# Patient Record
Sex: FEMALE | Race: BLACK OR AFRICAN AMERICAN | Employment: FULL TIME | URBAN - METROPOLITAN AREA
[De-identification: names, ages, dates, MRNs, and addresses within clinical notes are randomized per-mention and may not be internally consistent; named-entity substitution may affect disease eponyms.]

---

## 2017-01-03 ENCOUNTER — HOSPITAL ENCOUNTER (OUTPATIENT)
Dept: MAMMOGRAPHY | Age: 57
Discharge: HOME | End: 2017-01-03
Attending: INTERNAL MEDICINE
Payer: COMMERCIAL

## 2017-01-03 DIAGNOSIS — R09.81 SINUS CONGESTION: ICD-10-CM

## 2017-01-03 DIAGNOSIS — Z13.9 SCREENING: ICD-10-CM

## 2017-01-03 PROCEDURE — 77067 SCR MAMMO BI INCL CAD: CPT

## 2017-08-08 ENCOUNTER — OFFICE VISIT (OUTPATIENT)
Dept: INTERNAL MEDICINE CLINIC | Age: 57
End: 2017-08-08

## 2017-08-08 VITALS
HEIGHT: 63 IN | TEMPERATURE: 96.5 F | RESPIRATION RATE: 16 BRPM | BODY MASS INDEX: 22.15 KG/M2 | OXYGEN SATURATION: 100 % | DIASTOLIC BLOOD PRESSURE: 74 MMHG | HEART RATE: 75 BPM | SYSTOLIC BLOOD PRESSURE: 129 MMHG | WEIGHT: 125 LBS

## 2017-08-08 DIAGNOSIS — R73.03 PREDIABETES: ICD-10-CM

## 2017-08-08 DIAGNOSIS — M77.8 DELTOID TENDINITIS OF LEFT SHOULDER: Primary | ICD-10-CM

## 2017-08-08 DIAGNOSIS — R09.81 SINUS CONGESTION: ICD-10-CM

## 2017-08-08 RX ORDER — PREDNISONE 20 MG/1
60 TABLET ORAL
Qty: 21 TAB | Refills: 0 | Status: SHIPPED | OUTPATIENT
Start: 2017-08-08 | End: 2017-08-15

## 2017-08-08 NOTE — PROGRESS NOTES
SPORTS MEDICINE AND PRIMARY CARE  Americo Zapata MD, [address], [place]  [address]  Phone:  [phone]  Fax: [phone]       Chief Complaint   Patient presents with    Follow-up     Right Shoulder and arm stiffness and pain   . SUBJECTIVE:    Rukhsana Byrd is a 56 y.o. female Patient returns today alert, appropriate, ambulatory and has the capacity to give an accurate history. She has a known history of prediabetes, sinus congestion, and is seen for evaluation. Patient returns today complaining of pain in her right shoulder for the past three days, onset Saturday. She attributed it to carrying a heavy bag. She doesn't have a history of injury, there is no fall, no trama to the shoulder. Patient is seen for evaluation. Current Outpatient Prescriptions   Medication Sig Dispense Refill    predniSONE (DELTASONE) 20 mg tablet Take 3 Tabs by mouth daily (with breakfast). 21 Tab 0    cetirizine-psuedoePHEDrine (ZYRTEC-D) 5-120 mg per tablet Take 1 Tab by mouth two (2) times a day. 60 Tab 11    CYANOCOBALAMIN, VITAMIN B-12, (VITAMIN B-12 PO) Take 1 Tab by mouth daily.  ASCORBATE CALCIUM (VITAMIN C PO) Take 400 mg by mouth daily.  FERROUS SULFATE PO Take 1 Tab by mouth daily.  LACTOBACILLUS ACIDOPHILUS (PROBIOTIC PO) Take 2 Tabs by mouth daily.  MULTIVITAMIN PO Take 1 Tab by mouth daily.        Past Medical History:   Diagnosis Date    Deltoid tendinitis of left shoulder 2017    Gynecologic exam normal     Prediabetes 2016    Preventative health care 2016    S/P colonoscopy 2012    n    Sinus congestion      Past Surgical History:   Procedure Laterality Date    HX  SECTION      HX GYN       No Known Allergies      REVIEW OF SYSTEMS:  General: negative for - chills or fever  ENT: negative for - headaches, nasal congestion or tinnitus  Respiratory: negative for - cough, hemoptysis, shortness of breath or wheezing  Cardiovascular : negative for - chest pain, edema, palpitations or shortness of breath  Gastrointestinal: negative for - abdominal pain, blood in stools, heartburn or nausea/vomiting  Genito-Urinary: no dysuria, trouble voiding, or hematuria  Musculoskeletal: negative for - gait disturbance, joint pain, joint stiffness or joint swelling  Neurological: no TIA or stroke symptoms  Hematologic: no bruises, no bleeding, no swollen glands  Integument: no lumps, mole changes, nail changes or rash  Endocrine: no malaise/lethargy or unexpected weight changes      Social History     Social History    Marital status:      Spouse name: N/A    Number of children: N/A    Years of education: N/A     Social History Main Topics    Smoking status: Former Smoker    Smokeless tobacco: Never Used    Alcohol use No    Drug use: No    Sexual activity: Yes     Partners: Male     Birth control/ protection: None     Other Topics Concern    None     Social History Narrative    Habits: There is no history of alcohol, cigarette or drug abuse.           Social History: The patient is . Lives with her . She completed her AMADO. She is gainfully employed with [employer]. 22year old son. grandchildren. Patient is a member of [facility].          Family History: Father  at 59 of a myocardial infarction, with memory loss. She has one sister alive and well. Family History   Problem Relation Age of Onset    Heart Disease Father        OBJECTIVE:    Visit Vitals    /74 (BP 1 Location: Left arm, BP Patient Position: Sitting)    Pulse 75    Temp 96.5 °F (35.8 °C) (Oral)    Resp 16    Ht 5' 3\" (1.6 m)    Wt 125 lb (56.7 kg)    SpO2 100%    BMI 22.14 kg/m2     CONSTITUTIONAL: well , well nourished, appears age appropriate  EYES: perrla, eom intact  ENMT:moist mucous membranes, pharynx clear  NECK: supple.  Thyroid normal  RESPIRATORY: Chest: clear bilaterally CARDIOVASCULAR: Heart: regular rate and rhythm  GASTROINTESTINAL: Abdomen: soft, bowel sounds active  HEMATOLOGIC: no pathological lymph nodes palpated  MUSCULOSKELETAL: Extremities: no edema, pulse 1+   INTEGUMENT: No unusual rashes or suspicious skin lesions noted. Nails appear normal.  NEUROLOGIC: non-focal exam   MENTAL STATUS: alert and oriented, appropriate affect           ASSESSMENT:  1. Deltoid tendinitis of left shoulder    2. Prediabetes    3. Sinus congestion      The exam would suggest this is a deltoid tendinitis. that is bothersome is that I can't do  to exclude rotator cuff tendinitis, which I doubt. Will give her a short course of steroids and see if we can sort it out. If the steroids resolve it completely we will see her as previously scheduled. If she continues to have discomfort she will come back to the office in a week and at that time perhaps we could delineate discomfort more if it's related to deltoid tendinitis. Blood pressure remains at goal.    BMI reflects ideal body weight at 22.14, which suggest she not lose any further weight. She's lost about 5 pounds since we last saw her. I don't think this is good for her to continue to do and we suggest that she not lose any further weight. She will return to the office in one week . She's advised to  steroids. PLAN:  .  Orders Placed This Encounter    predniSONE (DELTASONE) 20 mg tablet       Follow-up Disposition:  Return in about 1 week (around 8/15/2017). ATTENTION:   This medical record was transcribed using an electronic medical records system. Although proofread, it may and can contain electronic and spelling errors. Other human spelling and other errors may be present. Corrections may be executed at a later time. Please feel free to contact us for any clarifications as needed.

## 2017-08-08 NOTE — PROGRESS NOTES
1. Have you been to the ER, urgent care clinic since your last visit? Hospitalized since your last visit? No    2. Have you seen or consulted any other health care providers outside of the [address] [place] [address] since your last visit? Include any pap smears or colon screening.  No

## 2017-08-08 NOTE — MR AVS SNAPSHOT
Visit Information Date & Time Provider Department Dept. Phone Encounter #  
 8/8/2017 12:00 PM Tanya Morin  [facility] Sports Medicine and Primary Care [phone] [id number] Follow-up Instructions Return in about 1 week (around 8/15/2017). Your Appointments 9/18/2017  2:45 PM  
ROUTINE CARE with Tanya Morin MD  
[address] [place] [address] and Primary Care [facility]) Appt Note: f/u  
 [address] [facility]  
  
   
 [address] [id number]  
  
    
 10/5/2017  2:30 PM  
YEAR CHECK UP with Alis Daly MD  
[facility] Appt Note: annual exam  
 [place] [address] [address]  
[phone]  
  
   
 [place] [address] [place] Upcoming Health Maintenance Date Due FOBT Q 1 YEAR AGE 50-75 6/4/2012 INFLUENZA AGE 9 TO ADULT 12/6/2017* DTaP/Tdap/Td series (1 - Tdap) 12/7/2017* BREAST CANCER SCRN MAMMOGRAM 1/3/2019 PAP AKA CERVICAL CYTOLOGY 10/4/2019 *Topic was postponed. The date shown is not the original due date. Allergies as of 8/8/2017  Review Complete On: 8/8/2017 By: Tanya Morin MD  
 No Known Allergies Current Immunizations  Never Reviewed No immunizations on file. Not reviewed this visit You Were Diagnosed With   
  
 Codes Comments Deltoid tendinitis of left shoulder    -  Primary ICD-10-CM: M75.82 ICD-9-CM: 726.10 Prediabetes     ICD-10-CM: R73.03 
ICD-9-CM: 790.29 Sinus congestion     ICD-10-CM: R09.81 ICD-9-CM: 478.19 Vitals BP Pulse Temp Resp Height(growth percentile) Weight(growth percentile) 129/74 (BP 1 Location: Left arm, BP Patient Position: Sitting) 75 96.5 °F (35.8 °C) (Oral) 16 5' 3\" (1.6 m) 125 lb (56.7 kg) SpO2 BMI OB Status Smoking Status 100% 22.14 kg/m2 Postmenopausal Former Smoker BMI and BSA Data Body Mass Index Body Surface Area  
 22.14 kg/m 2 1.59 m 2 Preferred Pharmacy Pharmacy Name Phone [facility] [address] [facility] Your Updated Medication List  
  
   
This list is accurate as of: 8/8/17  1:15 PM.  Always use your most recent med list.  
  
  
  
  
 cetirizine-psuedoePHEDrine 5-120 mg per tablet Commonly known as:  ZyrTEC-D Take 1 Tab by mouth two (2) times a day. FERROUS SULFATE PO Take 1 Tab by mouth daily. MULTIVITAMIN PO Take 1 Tab by mouth daily. predniSONE 20 mg tablet Commonly known as:  Mario Doheny Take 3 Tabs by mouth daily (with breakfast). PROBIOTIC PO Take 2 Tabs by mouth daily. VITAMIN B-12 PO Take 1 Tab by mouth daily. VITAMIN C PO Take 400 mg by mouth daily. Prescriptions Sent to Pharmacy Refills  
 predniSONE (DELTASONE) 20 mg tablet 0 Sig: Take 3 Tabs by mouth daily (with breakfast). Class: Normal  
 Pharmacy: [address] [place] [address] Ph #: [phone] Route: Oral  
  
Follow-up Instructions Return in about 1 week (around 8/15/2017). Introducing [facility] & HEALTH SERVICES! Dear Janay Chow: Thank you for requesting a World[employer] account. Our records indicate that you already have an active WorldViz account. You can access your account anytime at [email or web address] Did you know that you can access your hospital and ER discharge instructions at any time in WorldViz? You can also review all of your test results from your hospital stay or ER visit. Additional Information If you have questions, please visit the Frequently Asked Questions section of the World[employer] website at [email or web address] Remember, WorldViz is NOT to be used for urgent needs. For medical emergencies, dial 911. Now available from your iPhone and Android! Please provide this summary of care documentation to your next provider. Your primary care clinician is listed as Ger Lindsey. If you have any questions after today's visit, please call [phone].

## 2017-08-09 RX ORDER — NAPROXEN 500 MG/1
500 TABLET ORAL 2 TIMES DAILY WITH MEALS
Qty: 60 TAB | Refills: 11 | Status: SHIPPED | OUTPATIENT
Start: 2017-08-09 | End: 2017-08-15

## 2017-08-15 ENCOUNTER — OFFICE VISIT (OUTPATIENT)
Dept: INTERNAL MEDICINE CLINIC | Age: 57
End: 2017-08-15

## 2017-08-15 VITALS
DIASTOLIC BLOOD PRESSURE: 78 MMHG | OXYGEN SATURATION: 100 % | RESPIRATION RATE: 16 BRPM | HEIGHT: 63 IN | HEART RATE: 82 BPM | SYSTOLIC BLOOD PRESSURE: 123 MMHG | WEIGHT: 127.1 LBS | BODY MASS INDEX: 22.52 KG/M2 | TEMPERATURE: 99 F

## 2017-08-15 DIAGNOSIS — R73.03 PREDIABETES: ICD-10-CM

## 2017-08-15 DIAGNOSIS — M77.8 DELTOID TENDINITIS OF LEFT SHOULDER: Primary | ICD-10-CM

## 2017-08-15 NOTE — PROGRESS NOTES
SPORTS MEDICINE AND PRIMARY CARE  Evelyne Boswell MD, [place] [id number] [facility] [address]  Phone:  [phone]  Fax: [phone]      Chief Complaint   Patient presents with    Follow-up     1 week for right shoulder pain          SUBECTIVE:    Berlin Pillai is a 56 y.o. female Patient returns today alert, appropriate, ambulatory, has the capacity to give an accurate history. We saw her about a week ago with shoulder discomfort that we felt was more related to deltoid tendinitis and she was given a course of prednisone. She has a known history of prediabetes . Is seen for evaluation. Patient returns today saying she elected not to take the prednisone. She took some Motrin at night and then subsequently went to [employer] and begin praising and raising her arms and the discomfort was all resolved. She comes in today with significant improvement in discomfort and attributes this to the healing power of the [employer]        Current Outpatient Prescriptions   Medication Sig Dispense Refill    CYANOCOBALAMIN, VITAMIN B-12, (VITAMIN B-12 PO) Take 1 Tab by mouth daily.  ASCORBATE CALCIUM (VITAMIN C PO) Take 400 mg by mouth daily.  FERROUS SULFATE PO Take 1 Tab by mouth daily.  LACTOBACILLUS ACIDOPHILUS (PROBIOTIC PO) Take 2 Tabs by mouth daily.  MULTIVITAMIN PO Take 1 Tab by mouth daily. Past Medical History:   Diagnosis Date    Deltoid tendinitis of left shoulder 2017    Gynecologic exam normal     Prediabetes 2016    Preventative health care 2016    S/P colonoscopy 2012    n    Sinus congestion      Past Surgical History:   Procedure Laterality Date    HX  SECTION      HX GYN       No Known Allergies    REVIEW OF SYSTEMS:   . No other joint complaints.         Social History     Social History    Marital status:      Spouse name: N/A    Number of children: N/A    Years of education: N/A     Social History Main Topics    Smoking status: Former Smoker    Smokeless tobacco: Never Used    Alcohol use No    Drug use: No    Sexual activity: Yes     Partners: Male     Birth control/ protection: None     Other Topics Concern    None     Social History Narrative    Habits: There is no history of alcohol, cigarette or drug abuse.           Social History: The patient is . Lives with her . She completed her AMADO. She is gainfully employed with [employer]). 22year old son. grandchildren. Patient is a member of [facility].          Family History: Father  at 59 of a myocardial infarction, with memory loss. She has one sister alive and well.    r  Family History   Problem Relation Age of Onset    Heart Disease Father        OBJECTIVE:  Visit Vitals    /78 (BP 1 Location: Right arm, BP Patient Position: Sitting)    Pulse 82    Temp 99 °F (37.2 °C) (Oral)    Resp 16    Ht 5' 3\" (1.6 m)    Wt 127 lb 1.6 oz (57.7 kg)    SpO2 100%    BMI 22.51 kg/m2     ENT: perrla,  eom intact  NECK: supple. Thyroid normal  CHEST: clear to ascultation and percussion   HEART: regular rate and rhythm  ABD: soft, bowel sounds active  EXTREMITIES: no edema, pulse 1+     No visits with results within 3 Month(s) from this visit. Latest known visit with results is:    Office Visit on 2016   Component Date Value Ref Range Status    Hepatitis A Ab, IgM 2016 Negative  Negative Final    Hep B surface Ag screen 2016 Negative  Negative Final    Hep B Core Ab, IgM 2016 Negative  Negative Final    Hep C Virus Ab 2016 0.2  0.0 - 0.9 s/co ratio Final    Comment:                                   Negative:     < 0.8                               Indeterminate: 0.8 - 0.9                                    Positive:     > 0.9   The CDC recommends that a positive HCV antibody result   be followed up with a HCV Nucleic Acid Amplification   test (983184).       Glucose 2016 79  65 - 99 mg/dL Final    BUN 11/28/2016 11  6 - 24 mg/dL Final    Creatinine 11/28/2016 0.70  0.57 - 1.00 mg/dL Final    GFR est non-AA 11/28/2016 99  >59 mL/min/1.73 Final    GFR est AA 11/28/2016 114  >59 mL/min/1.73 Final    BUN/Creatinine ratio 11/28/2016 16  9 - 23 Final    Sodium 11/28/2016 141  136 - 144 mmol/L Final    Comment: **Effective December 12, 2016 the reference interval**    for Sodium, Serum will be changing to:                                              134 - 144      Potassium 11/28/2016 4.0  3.5 - 5.2 mmol/L Final    Chloride 11/28/2016 99  97 - 106 mmol/L Final    Comment: **Effective December 12, 2016 the reference interval**    for Chloride, Serum will be changing to:                                               96 - 106      CO2 11/28/2016 27  18 - 29 mmol/L Final    Calcium 11/28/2016 10.1  8.7 - 10.2 mg/dL Final          ASSESSMENT:  1. Deltoid tendinitis of left shoulder    2. Prediabetes      The resolution of the deltoid tendinitis was not from medication, but by the spirit of the [address] and praise. She is doing very well. She is  ROM back to normal. There is no tenderness of the deltoid tendon as noted previously. She is doing exceptionally well. Blood pressure control is at goal.  BMI reflects ideal body weight. She will return to see us in a year or sooner if she has any problems. PLAN:  .  Orders Placed This Encounter    URINALYSIS W/ RFLX MICROSCOPIC    CBC WITH AUTOMATED DIFF    METABOLIC PANEL, COMPREHENSIVE    LIPID PANEL    TSH 3RD GENERATION    HEMOGLOBIN A1C WITH EAG       Follow-up Disposition:  Return in about 1 year (around 8/15/2018). ATTENTION:   This medical record was transcribed using an electronic medical records system. Although proofread, it may and can contain electronic and spelling errors. Other human spelling and other errors may be present. Corrections may be executed at a later time.   Please feel free to contact us for any clarifications as needed.

## 2017-08-15 NOTE — PROGRESS NOTES
1. Have you been to the ER, urgent care clinic since your last visit? Hospitalized since your last visit? No    2. Have you seen or consulted any other health care providers outside of the [address] [place] [address] since your last visit? Include any pap smears or colon screening.  No

## 2017-08-15 NOTE — MR AVS SNAPSHOT
Visit Information Date & Time Provider Department Dept. Phone Encounter #  
 8/15/2017 10:30 AM Lucia Fischer MD [facility] [phone] [id number] Follow-up Instructions Return in about 1 year (around 8/15/2018). Follow-up and Disposition History Your Appointments 9/18/2017  2:45 PM  
ROUTINE CARE with Lucia Fischer MD  
[address] [place] [address] and Primary Care [facility]) Appt Note: f/u  
 [address] [facility] [place]  
  
   
 [address] [phone]  
  
    
 9/21/2017 11:30 AM  
ROUTINE CARE with Lucia Fischer MD  
[address] [place] [address] and Primary Care [facility]) Appt Note: 1m f/u  
 [address]  
[phone]  
  
    
 10/5/2017  2:30 PM  
YEAR CHECK UP with Amy Spring MD  
[facility] ([facility]) Appt Note: annual exam  
 [place] [address] [facility] [address] [address]  
[phone]  
  
   
 [place] [address] [place] [address] Upcoming Health Maintenance Date Due FOBT Q 1 YEAR AGE 50-75 6/4/2012 INFLUENZA AGE 9 TO ADULT 12/6/2017* DTaP/Tdap/Td series (1 - Tdap) 12/7/2017* BREAST CANCER SCRN MAMMOGRAM 1/3/2019 PAP AKA CERVICAL CYTOLOGY 10/4/2019 *Topic was postponed. The date shown is not the original due date. Allergies as of 8/15/2017  Review Complete On: 8/15/2017 By: Lucia Fischer MD  
 No Known Allergies Current Immunizations  Never Reviewed No immunizations on file. Not reviewed this visit You Were Diagnosed With   
  
 Codes Comments Deltoid tendinitis of left shoulder    -  Primary ICD-10-CM: M75.82 ICD-9-CM: 726.10 Prediabetes     ICD-10-CM: R73.03 
ICD-9-CM: 790.29 Vitals BP Pulse Temp Resp Height(growth percentile) Weight(growth percentile) 123/78 (BP 1 Location: Right arm, BP Patient Position: Sitting) 82 99 °F (37.2 °C) (Oral) 16 5' 3\" (1.6 m) 127 lb 1.6 oz (57.7 kg) SpO2 BMI OB Status Smoking Status 100% 22.51 kg/m2 Postmenopausal Former Smoker BMI and BSA Data Body Mass Index Body Surface Area  
 22.51 kg/m 2 1.6 m 2 Preferred Pharmacy Pharmacy Name Phone [facility] [address] Your Updated Medication List  
  
   
This list is accurate as of: 8/15/17  4:32 PM.  Always use your most recent med list.  
  
  
  
  
 FERROUS SULFATE PO Take 1 Tab by mouth daily. MULTIVITAMIN PO Take 1 Tab by mouth daily. PROBIOTIC PO Take 2 Tabs by mouth daily. VITAMIN B-12 PO Take 1 Tab by mouth daily. VITAMIN C PO Take 400 mg by mouth daily. We Performed the Following CBC WITH AUTOMATED DIFF [31935 CPT(R)] HEMOGLOBIN A1C WITH EAG [54507 CPT(R)] LIPID PANEL [13307 CPT(R)] METABOLIC PANEL, COMPREHENSIVE [74115 CPT(R)] CT COLLECTION VENOUS BLOOD,VENIPUNCTURE B1780823 CPT(R)] TSH 3RD GENERATION [80582 CPT(R)] URINALYSIS W/ RFLX MICROSCOPIC [89858 CPT(R)] Follow-up Instructions Return in about 1 year (around 8/15/2018). Introducing [facility] & HEALTH SERVICES! Dear Yann Yap: Thank you for requesting a Tem[employer] account. Our records indicate that you already have an active Tempronics account. You can access your account anytime at [email or web address]/Ztail Did you know that you can access your hospital and ER discharge instructions at any time in Tempronics? You can also review all of your test results from your hospital stay or ER visit. Additional Information If you have questions, please visit the Frequently Asked Questions section of the Tempronics website at [email or web address] Remember, Tempronics is NOT to be used for urgent needs.  For medical emergencies, dial 911. Now available from your iPhone and Android! Please provide this summary of care documentation to your next provider. Your primary care clinician is listed as Anita Omer. If you have any questions after today's visit, please call [phone].

## 2017-08-16 LAB
ALBUMIN SERPL-MCNC: 4.3 G/DL (ref 3.5–5.5)
ALBUMIN/GLOB SERPL: 1.4 {RATIO} (ref 1.2–2.2)
ALP SERPL-CCNC: 84 IU/L (ref 39–117)
ALT SERPL-CCNC: 20 IU/L (ref 0–32)
APPEARANCE UR: ABNORMAL
AST SERPL-CCNC: 25 IU/L (ref 0–40)
BACTERIA #/AREA URNS HPF: ABNORMAL /[HPF]
BASOPHILS # BLD AUTO: 0 X10E3/UL (ref 0–0.2)
BASOPHILS NFR BLD AUTO: 1 %
BILIRUB SERPL-MCNC: <0.2 MG/DL (ref 0–1.2)
BILIRUB UR QL STRIP: NEGATIVE
BUN SERPL-MCNC: 16 MG/DL (ref 6–24)
BUN/CREAT SERPL: 25 (ref 9–23)
CALCIUM SERPL-MCNC: 10 MG/DL (ref 8.7–10.2)
CASTS URNS QL MICRO: ABNORMAL /LPF
CHLORIDE SERPL-SCNC: 100 MMOL/L (ref 96–106)
CHOLEST SERPL-MCNC: 184 MG/DL (ref 100–199)
CO2 SERPL-SCNC: 24 MMOL/L (ref 18–29)
COLOR UR: YELLOW
CREAT SERPL-MCNC: 0.64 MG/DL (ref 0.57–1)
CRYSTALS URNS MICRO: ABNORMAL
EOSINOPHIL # BLD AUTO: 0 X10E3/UL (ref 0–0.4)
EOSINOPHIL NFR BLD AUTO: 0 %
EPI CELLS #/AREA URNS HPF: ABNORMAL /HPF
ERYTHROCYTE [DISTWIDTH] IN BLOOD BY AUTOMATED COUNT: 14.1 % (ref 12.3–15.4)
EST. AVERAGE GLUCOSE BLD GHB EST-MCNC: 120 MG/DL
GLOBULIN SER CALC-MCNC: 3.1 G/DL (ref 1.5–4.5)
GLUCOSE SERPL-MCNC: 99 MG/DL (ref 65–99)
GLUCOSE UR QL: NEGATIVE
HBA1C MFR BLD: 5.8 % (ref 4.8–5.6)
HCT VFR BLD AUTO: 42.3 % (ref 34–46.6)
HDLC SERPL-MCNC: 68 MG/DL
HGB BLD-MCNC: 13.6 G/DL (ref 11.1–15.9)
HGB UR QL STRIP: NEGATIVE
IMM GRANULOCYTES # BLD: 0 X10E3/UL (ref 0–0.1)
IMM GRANULOCYTES NFR BLD: 1 %
KETONES UR QL STRIP: NEGATIVE
LDLC SERPL CALC-MCNC: 97 MG/DL (ref 0–99)
LEUKOCYTE ESTERASE UR QL STRIP: ABNORMAL
LYMPHOCYTES # BLD AUTO: 1.5 X10E3/UL (ref 0.7–3.1)
LYMPHOCYTES NFR BLD AUTO: 44 %
MCH RBC QN AUTO: 28.2 PG (ref 26.6–33)
MCHC RBC AUTO-ENTMCNC: 32.2 G/DL (ref 31.5–35.7)
MCV RBC AUTO: 88 FL (ref 79–97)
MICRO URNS: ABNORMAL
MONOCYTES # BLD AUTO: 0.4 X10E3/UL (ref 0.1–0.9)
MONOCYTES NFR BLD AUTO: 10 %
MUCOUS THREADS URNS QL MICRO: PRESENT
NEUTROPHILS # BLD AUTO: 1.6 X10E3/UL (ref 1.4–7)
NEUTROPHILS NFR BLD AUTO: 44 %
NITRITE UR QL STRIP: NEGATIVE
PH UR STRIP: 6 [PH] (ref 5–7.5)
PLATELET # BLD AUTO: 342 X10E3/UL (ref 150–379)
POTASSIUM SERPL-SCNC: 4.6 MMOL/L (ref 3.5–5.2)
PROT SERPL-MCNC: 7.4 G/DL (ref 6–8.5)
PROT UR QL STRIP: NEGATIVE
RBC # BLD AUTO: 4.82 X10E6/UL (ref 3.77–5.28)
RBC #/AREA URNS HPF: ABNORMAL /HPF
SODIUM SERPL-SCNC: 140 MMOL/L (ref 134–144)
SP GR UR: 1.02 (ref 1–1.03)
TRIGL SERPL-MCNC: 95 MG/DL (ref 0–149)
TSH SERPL DL<=0.005 MIU/L-ACNC: 0.54 UIU/ML (ref 0.45–4.5)
UNIDENT CRYS URNS QL MICRO: PRESENT
UROBILINOGEN UR STRIP-MCNC: 0.2 MG/DL (ref 0.2–1)
VLDLC SERPL CALC-MCNC: 19 MG/DL (ref 5–40)
WBC # BLD AUTO: 3.5 X10E3/UL (ref 3.4–10.8)
WBC #/AREA URNS HPF: ABNORMAL /HPF

## 2017-09-18 ENCOUNTER — OFFICE VISIT (OUTPATIENT)
Dept: INTERNAL MEDICINE CLINIC | Age: 57
End: 2017-09-18

## 2017-09-18 VITALS
OXYGEN SATURATION: 96 % | HEART RATE: 80 BPM | HEIGHT: 63 IN | DIASTOLIC BLOOD PRESSURE: 75 MMHG | SYSTOLIC BLOOD PRESSURE: 119 MMHG | WEIGHT: 130.7 LBS | TEMPERATURE: 97.2 F | RESPIRATION RATE: 18 BRPM | BODY MASS INDEX: 23.16 KG/M2

## 2017-09-18 DIAGNOSIS — Z00.00 PREVENTATIVE HEALTH CARE: Primary | ICD-10-CM

## 2017-09-18 DIAGNOSIS — R73.03 PREDIABETES: ICD-10-CM

## 2017-09-18 NOTE — PROGRESS NOTES
1. Have you been to the ER, urgent care clinic since your last visit? Hospitalized since your last visit? No    2. Have you seen or consulted any other health care providers outside of the [address] [place] [address] since your last visit? Include any pap smears or colon screening.  No

## 2017-09-18 NOTE — MR AVS SNAPSHOT
Visit Information Date & Time Provider Department Dept. Phone Encounter #  
 9/18/2017  2:45 PM Conner Chowdhury MD [facility] Sports Medicine and Primary Care [phone] [id number] Follow-up Instructions Return in about 1 year (around 9/18/2018). Follow-up and Disposition History Your Appointments 9/21/2017 11:30 AM  
ROUTINE CARE with Connre Chowdhury MD  
[address] [place] [address] and Primary Care [facility]) Appt Note: 1m f/u  
 [address] [facility]  
  
   
 [address] [phone]  
  
    
 10/5/2017  2:30 PM  
YEAR CHECK UP with Nya Ricardo MD  
[facility]GYN [facility]) Appt Note: annual exam; r/s  
 [place] [address] [address]  
[phone]  
  
   
 [place] [address] [place] [address] Upcoming Health Maintenance Date Due FOBT Q 1 YEAR AGE 50-75 6/4/2012 INFLUENZA AGE 9 TO ADULT 12/6/2017* DTaP/Tdap/Td series (1 - Tdap) 12/7/2017* BREAST CANCER SCRN MAMMOGRAM 1/3/2019 PAP AKA CERVICAL CYTOLOGY 10/4/2019 *Topic was postponed. The date shown is not the original due date. Allergies as of 9/18/2017  Review Complete On: 9/18/2017 By: Conner Chowdhury MD  
 No Known Allergies Current Immunizations  Never Reviewed No immunizations on file. Not reviewed this visit You Were Diagnosed With   
  
 Codes Comments Preventative health care    -  Primary ICD-10-CM: Z00.00 ICD-9-CM: V70.0 Prediabetes     ICD-10-CM: R73.03 
ICD-9-CM: 790.29 Vitals BP Pulse Temp Resp Height(growth percentile) Weight(growth percentile) 119/75 (BP 1 Location: Left arm, BP Patient Position: Sitting) 80 97.2 °F (36.2 °C) (Oral) 18 5' 3\" (1.6 m) 130 lb 11.2 oz (59.3 kg) SpO2 BMI OB Status Smoking Status 96% 23.15 kg/m2 Postmenopausal Former Smoker BMI and BSA Data Body Mass Index Body Surface Area  
 23.15 kg/m 2 1.62 m 2 Preferred Pharmacy Pharmacy Name Phone [facility] [address] [facility] Your Updated Medication List  
  
   
This list is accurate as of: 9/18/17  4:00 PM.  Always use your most recent med list.  
  
  
  
  
 FERROUS SULFATE PO Take 1 Tab by mouth daily. MULTIVITAMIN PO Take 1 Tab by mouth daily. VITAMIN B-12 PO Take 1 Tab by mouth daily. VITAMIN C PO Take 400 mg by mouth daily. Follow-up Instructions Return in about 1 year (around 9/18/2018). Introducing \[facility]\"" & [facility] SERVICES! Dear Vega Bhakta: Thank you for requesting a [employer] account. Our records indicate that you already have an active [employer] account. You can access your account anytime at [email or web address] [email or web address]/[email or web address] [employer]s Did you know that you can access your hospital and ER discharge instructions at any time in Spectrum Bridge? You can also review all of your test results from your hospital stay or ER visit. Additional Information If you have questions, please visit the Frequently Asked Questions section of the [employer] website at [email or web address] [email or web address]/. Remember, [employer] is NOT to be used for urgent needs. For medical emergencies, dial 911. Now available from your iPhone and Android! Please provide this summary of care documentation to your next provider. Your primary care clinician is listed as Юлия Moon. If you have any questions after today's visit, please call [phone].

## 2017-09-18 NOTE — PROGRESS NOTES
SPORTS MEDICINE AND PRIMARY CARE  Cheli Jeong MD, [address] [address]  Phone:  [phone]  Fax: [phone]       Chief Complaint   Patient presents with   [facility] Wellness Visit   . SUBJECTIVE:    Mercy Fernandez is a 56 y.o. female Patient returns today alert, appropriate, ambulatory and has the capacity to give an accurate history. She has a known history of prediabetes, deltoid tendinitis, and is seen for evaluation. Patient is here for a yearly. Her shoulder is no longer bothering her. She is concerned about prediabetes. She is pleased she has finally reached the 130 [address] and is seen for evaluation. Current Outpatient Prescriptions   Medication Sig Dispense Refill    CYANOCOBALAMIN, VITAMIN B-12, (VITAMIN B-12 PO) Take 1 Tab by mouth daily.  ASCORBATE CALCIUM (VITAMIN C PO) Take 400 mg by mouth daily.  FERROUS SULFATE PO Take 1 Tab by mouth daily.  MULTIVITAMIN PO Take 1 Tab by mouth daily.        Past Medical History:   Diagnosis Date    Deltoid tendinitis of left shoulder 2017    Gynecologic exam normal     Prediabetes 2016    Preventative health care 2016    S/P colonoscopy 2012    n    Sinus congestion      Past Surgical History:   Procedure Laterality Date    HX  SECTION      HX GYN       No Known Allergies      REVIEW OF SYSTEMS:  General: negative for - chills or fever  ENT: negative for - headaches, nasal congestion or tinnitus  Respiratory: negative for - cough, hemoptysis, shortness of breath or wheezing  Cardiovascular : negative for - chest pain, edema, palpitations or shortness of breath  Gastrointestinal: negative for - abdominal pain, blood in stools, heartburn or nausea/vomiting  Genito-Urinary: no dysuria, trouble voiding, or hematuria  Musculoskeletal: negative for - gait disturbance, joint pain, joint stiffness or joint swelling  Neurological: no TIA or stroke symptoms  Hematologic: no bruises, no bleeding, no swollen glands  Integument: no lumps, mole changes, nail changes or rash  Endocrine: no malaise/lethargy or unexpected weight changes      Social History     Social History    Marital status:      Spouse name: N/A    Number of children: N/A    Years of education: N/A     Social History Main Topics    Smoking status: Former Smoker    Smokeless tobacco: Never Used    Alcohol use No    Drug use: No    Sexual activity: Yes     Partners: Male     Birth control/ protection: None     Other Topics Concern    None     Social History Narrative    Habits: There is no history of alcohol, cigarette or drug abuse.           Social History: The patient is . Lives with her . She completed her AMADO. She is gainfully employed with [employer] and [employer]. 22year old son. grandchildren. Patient is a member of [facility].          Family History: Father  at 59 of a myocardial infarction, with memory loss. She has one sister alive and well. Family History   Problem Relation Age of Onset    Heart Disease Father        OBJECTIVE:    Visit Vitals    /75 (BP 1 Location: Left arm, BP Patient Position: Sitting)    Pulse 80    Temp 97.2 °F (36.2 °C) (Oral)    Resp 18    Ht 5' 3\" (1.6 m)    Wt 130 lb 11.2 oz (59.3 kg)    SpO2 96%    BMI 23.15 kg/m2     CONSTITUTIONAL: well , well nourished, appears age appropriate  EYES: perrla, eom intact  ENMT:moist mucous membranes, pharynx clear  NECK: supple. Thyroid normal  RESPIRATORY: Chest: clear bilaterally   CARDIOVASCULAR: Heart: regular rate and rhythm  GASTROINTESTINAL: Abdomen: soft, bowel sounds active  HEMATOLOGIC: no pathological lymph nodes palpated  MUSCULOSKELETAL: Extremities: no edema, pulse 1+   INTEGUMENT: No unusual rashes or suspicious skin lesions noted. Nails appear normal.  NEUROLOGIC: non-focal exam   MENTAL STATUS: alert and oriented, appropriate affect           ASSESSMENT:  1. Preventative health care    2. Prediabetes      Patient's medical condition is stable. Her BMI reflects ideal body weight. The actual weight, however, is  and we certainly are pleased that she gained the weight. Blood pressure control is at goal.    Lab studies are reviewed and are completely unremarkable except the Hgb A1c of 5.8, which is an improvement from the last visit a year ago. Her cholesterol is at goal with LDL 97, HDL 68 and cholesterol of 184. most days of the week and we encouraged physical activity and a heart healthy lifestyle. She'll return to the office in one year, sooner if she has any problems. We gave her the name of   for OB/GYN. PLAN:  . No orders of the defined types were placed in this encounter. Follow-up Disposition:  Return in about 1 year (around 9/18/2018). ATTENTION:   This medical record was transcribed using an electronic medical records system. Although proofread, it may and can contain electronic and spelling errors. Other human spelling and other errors may be present. Corrections may be executed at a later time. Please feel free to contact us for any clarifications as needed.

## 2017-10-05 ENCOUNTER — OFFICE VISIT (OUTPATIENT)
Dept: OBGYN CLINIC | Age: 57
End: 2017-10-05

## 2017-10-05 VITALS
HEIGHT: 63 IN | SYSTOLIC BLOOD PRESSURE: 119 MMHG | HEART RATE: 83 BPM | DIASTOLIC BLOOD PRESSURE: 78 MMHG | BODY MASS INDEX: 23.32 KG/M2 | WEIGHT: 131.6 LBS

## 2017-10-05 DIAGNOSIS — Z01.419 WELL FEMALE EXAM WITH ROUTINE GYNECOLOGICAL EXAM: Primary | ICD-10-CM

## 2017-10-05 DIAGNOSIS — Z12.31 VISIT FOR SCREENING MAMMOGRAM: ICD-10-CM

## 2017-10-05 DIAGNOSIS — Z01.419 WELL WOMAN EXAM WITH ROUTINE GYNECOLOGICAL EXAM: ICD-10-CM

## 2017-10-05 NOTE — PROGRESS NOTES
Terrie Lugo is a No obstetric history on file. ,  56 y.o. female [address] whose LMP was on  who presents for her annual checkup. She is having no significant problems. Menstrual status:    She is not having periods because she is menopausal.    The patient is not using HRT. Contraception:    She does not need contraception because she is menopausal.    Sexual history:    She  reports that she currently engages in sexual activity and has had male partners. She reports using the following method of birth control/protection: None. Medical conditions:    Since her last annual GYN exam about one year ago, she has had the following changes in her health history: none. Pap and Mammogram History:    Her most recent Pap smear was negative and HPV negative obtained 1 year(s) ago. The patient had a recent mammogram 2017 which was negative for malignancy. Breast Cancer History/Substance Abuse:    She has no family history of breast cancer. Osteoporosis History:    Family history does not include a first or second degree relative with osteopenia or osteoporosis. A bone density scan has not been previously obtained. Past Medical History:   Diagnosis Date    Deltoid tendinitis of left shoulder 2017    Gynecologic exam normal     Prediabetes 2016    Preventative health care 2016    S/P colonoscopy 2012    n    Sinus congestion      Past Surgical History:   Procedure Laterality Date    HX  SECTION      HX GYN       Current Outpatient Prescriptions   Medication Sig Dispense Refill    CYANOCOBALAMIN, VITAMIN B-12, (VITAMIN B-12 PO) Take 1 Tab by mouth daily.  ASCORBATE CALCIUM (VITAMIN C PO) Take 400 mg by mouth daily.  FERROUS SULFATE PO Take 1 Tab by mouth daily.  MULTIVITAMIN PO Take 1 Tab by mouth daily. Allergies: Review of patient's allergies indicates no known allergies.    Social History     Social History    Marital status:      Spouse name: N/A    Number of children: N/A    Years of education: N/A     Occupational History    Not on file. Social History Main Topics    Smoking status: Former Smoker    Smokeless tobacco: Never Used    Alcohol use No    Drug use: No    Sexual activity: Yes     Partners: Male     Birth control/ protection: None     Other Topics Concern    Not on file     Social History Narrative    Habits: There is no history of alcohol, cigarette or drug abuse.           Social History: The patient is . Lives with her . She completed her AMADO. She is gainfully employed with [employer] and [employer]. 22year old son. grandchildren. Patient is a member of [employer].          Family History: Father  at 59 of a myocardial infarction, with memory loss. She has one sister alive and well. Tobacco History:  reports that she has quit smoking. She has never used smokeless tobacco.  Alcohol Abuse:  reports that she does not drink alcohol. Drug Abuse:  reports that she does not use illicit drugs.   Patient Active Problem List   Diagnosis Code    Preventative health care Z00.00    S/P colonoscopy Z98.890    Gynecologic exam normal Z01.419    Prediabetes R73.03    Sinus congestion R09.81    Deltoid tendinitis of left shoulder M75.82         Review of Systems - History obtained from the patient  Constitutional: negative for weight loss, fever, night sweats  HEENT: negative for hearing loss, earache, congestion, snoring, sorethroat  CV: negative for chest pain, palpitations, edema  Resp: negative for cough, shortness of breath, wheezing  GI: negative for change in bowel habits, abdominal pain, black or bloody stools  : negative for frequency, dysuria, hematuria, vaginal discharge  MSK: negative for back pain, joint pain, muscle pain  Breast: negative for breast lumps, nipple discharge, galactorrhea  Skin :negative for itching, rash, hives  Neuro: negative for dizziness, headache, confusion, weakness  Psych: negative for anxiety, depression, change in mood  Heme/lymph: negative for bleeding, bruising, pallor    Physical Exam    Visit Vitals    /78 (BP 1 Location: Right arm, BP Patient Position: Sitting)    Pulse 83    Ht 5' 3\" (1.6 m)    Wt 131 lb 9.6 oz (59.7 kg)    BMI 23.31 kg/m2     Constitutional  · Appearance: well-nourished, well developed, alert, in no acute distress    HENT  · Head and Face: appears normal    Neck  · Inspection/Palpation: normal appearance, no masses or tenderness  · Lymph Nodes: no lymphadenopathy present    Chest  · Respiratory Effort: breathing normal    Breasts  · Inspection of Breasts: breasts symmetrical, no skin changes, no discharge present, nipple appearance normal, no skin retraction present  · Palpation of Breasts and Axillae: no masses present on palpation, no breast tenderness  · Axillary Lymph Nodes: no lymphadenopathy present    Gastrointestinal  · Abdominal Examination: abdomen non-tender to palpation, normal bowel sounds, no masses present  · Liver and spleen: no hepatomegaly present, spleen not palpable  · Hernias: no hernias identified    Skin  · General Inspection: no rash, no lesions identified    Neurologic/Psychiatric  · Mental Status:  · Orientation: grossly oriented to person, place and time  · Mood and Affect: mood normal, affect appropriate    Genitourinary  · External Genitalia: normal appearance for age, no discharge present, no tenderness present, no inflammatory lesions present, no masses present, no atrophy present  · Vagina: normal vaginal vault without central or paravaginal defects, no discharge present, no inflammatory lesions present, no masses present, atrophic changes present  · Bladder: non-tender to palpation  · Urethra: appears normal  · Cervix: normal   · Uterus: normal size, shape and consistency  · Adnexa: no adnexal tenderness present, no adnexal masses present  · Perineum: perineum within normal limits, no evidence of trauma, no rashes or skin lesions present  · Anus: anus within normal limits, no hemorrhoids present  · Inguinal Lymph Nodes: no lymphadenopathy present    Assessment:  Routine gynecologic examination  Her current medical status is satisfactory with no evidence of significant gynecologic issues.     Plan:  Counseled re: diet, exercise, healthy lifestyle  Return for yearly wellness visits  Rec annual mammogram  Patient Verbalized understanding

## 2017-10-05 NOTE — MR AVS SNAPSHOT
Visit Information Date & Time Provider Department Dept. Phone Encounter #  
 10/5/2017  2:30 PM Jasbir Alexander MD [facility] [phone] [id number] Your Appointments 9/20/2018  9:00 AM  
Any with Emilia Roy MD  
[address] [place] [address] and Primary Care [facility]) Appt Note: 1 year f/u  
 [address] [facility]  
  
   
 [address] [id number] Upcoming Health Maintenance Date Due FOBT Q 1 YEAR AGE 50-75 6/4/2012 INFLUENZA AGE 9 TO ADULT 12/6/2017* BREAST CANCER SCRN MAMMOGRAM 1/3/2019 PAP AKA CERVICAL CYTOLOGY 10/4/2019 *Topic was postponed. The date shown is not the original due date. Allergies as of 10/5/2017  Review Complete On: 10/5/2017 By: Sonam Ricardo LPN No Known Allergies Current Immunizations  Never Reviewed No immunizations on file. Not reviewed this visit You Were Diagnosed With   
  
 Codes Comments Well female exam with routine gynecological exam    -  Primary ICD-10-CM: V84.513 ICD-9-CM: V72.31 Visit for screening mammogram     ICD-10-CM: Z12.31 
ICD-9-CM: V76.12 Vitals Height(growth percentile) Weight(growth percentile) BMI OB Status Smoking Status 5' 3\" (1.6 m) 131 lb 9.6 oz (59.7 kg) 23.31 kg/m2 Postmenopausal Former Smoker BMI and BSA Data Body Mass Index Body Surface Area  
 23.31 kg/m 2 1.63 m 2 Preferred Pharmacy Pharmacy Name Phone [facility] [address] Your Updated Medication List  
  
   
This list is accurate as of: 10/5/17  2:51 PM.  Always use your most recent med list.  
  
  
  
  
 FERROUS SULFATE PO Take 1 Tab by mouth daily. MULTIVITAMIN PO Take 1 Tab by mouth daily. VITAMIN B-12 PO Take 1 Tab by mouth daily. VITAMIN C PO Take 400 mg by mouth daily. Patient Instructions Well Visit, Women 48 to 72: Care Instructions Your Care Instructions Physical exams can help you stay healthy. Your doctor has checked your overall health and may have suggested ways to take good care of yourself. He or she also may have recommended tests. At home, you can help prevent illness with healthy eating, regular exercise, and other steps. Follow-up care is a key part of your treatment and safety. Be sure to make and go to all appointments, and call your doctor if you are having problems. It's also a good idea to know your test results and keep a list of the medicines you take. How can you care for yourself at home? · Reach and stay at a healthy weight. This will lower your risk for many problems, such as obesity, diabetes, heart disease, and high blood pressure. · Get at least 30 minutes of exercise on most days of the week. Walking is a good choice. You also may want to do other activities, such as running, swimming, cycling, or playing tennis or team sports. · Do not smoke. Smoking can make health problems worse. If you need help quitting, talk to your doctor about stop-smoking programs and medicines. These can increase your chances of quitting for good. · Protect your skin from too much sun. When you're outdoors from 10 a.m. to 4 p.m., stay in the shade or cover up with clothing and a hat with a wide brim. Wear sunglasses that block UV rays. Even when it's cloudy, put broad-spectrum sunscreen (SPF 30 or higher) on any exposed skin. · See a dentist one or two times a year for checkups and to have your teeth cleaned. · Wear a seat belt in the car. · Limit alcohol to 1 drink a day. Too much alcohol can cause health problems. Follow your doctor's advice about when to have certain tests. These tests can spot problems early. · Cholesterol.  Your doctor will tell you how often to have this done based on your age, family history, or other things that can increase your risk for heart attack and stroke. · Blood pressure. Have your blood pressure checked during a routine doctor visit. Your doctor will tell you how often to check your blood pressure based on your age, your blood pressure results, and other factors. · Mammogram. Ask your doctor how often you should have a mammogram, which is an X-ray of your breasts. A mammogram can spot breast cancer before it can be felt and when it is easiest to treat. · Pap test and pelvic exam. Ask your doctor how often you should have a Pap test. You may not need to have a Pap test as often as you used to. · Vision. Have your eyes checked every year or two or as often as your doctor suggests. Some experts recommend that you have yearly exams for glaucoma and other age-related eye problems starting at age 48. · Hearing. Tell your doctor if you notice any change in your hearing. You can have tests to find out how well you hear. · Diabetes. Ask your doctor whether you should have tests for diabetes. · Colon cancer. You should begin tests for colon cancer at age 48. You may have one of several tests. Your doctor will tell you how often to have tests based on your age and risk. Risks include whether you already had a precancerous polyp removed from your colon or whether your parents, sisters and brothers, or children have had colon cancer. · Thyroid disease. Talk to your doctor about whether to have your thyroid checked as part of a regular physical exam. Women have an increased chance of a thyroid problem. · Osteoporosis. You should begin tests for bone density at age 72. If you are younger than 72, ask your doctor whether you have factors that may increase your risk for this disease. You may want to have this test before age 72. · Heart attack and stroke risk. At least every 4 to 6 years, you should have your risk for heart attack and stroke assessed.  Your doctor uses factors such as your age, blood pressure, cholesterol, and whether you smoke or have diabetes to show what your risk for a heart attack or stroke is over the next 10 years. When should you call for help? Watch closely for changes in your health, and be sure to contact your doctor if you have any problems or symptoms that concern you. Where can you learn more? Go to http://julio-tati.info/. Enter F791 in the search box to learn more about \"Well Visit, Women 50 to 72: Care Instructions. \" Current as of: July 19, 2016 Content Version: 11.3 © 0734-3535 [employer]. Care instructions adapted under license by [employer] (which disclaims liability or warranty for this information). If you have questions about a medical condition or this instruction, always ask your healthcare professional. [address] any warranty or liability for your use of this information. Introducing [facility]! Dear Santosh Gracia: Thank you for requesting a [employer] account. Our records indicate that you already have an active [employer] account. You can access your account anytime at [email or web address] Did you know that you can access your hospital and ER discharge instructions at any time in [employer]? You can also review all of your test results from your hospital stay or ER visit. Additional Information If you have questions, please visit the Frequently Asked Questions section of the [employer] website at [email or web address] Remember, [employer] is NOT to be used for urgent needs. For medical emergencies, dial 911. Now available from your iPhone and Android! Please provide this summary of care documentation to your next provider. Your primary care clinician is listed as Sabine Mcdermott. If you have any questions after today's visit, please call [phone].

## 2017-10-05 NOTE — PATIENT INSTRUCTIONS
Well Visit, Women 48 to 72: Care Instructions  Your Care Instructions  Physical exams can help you stay healthy. Your doctor has checked your overall health and may have suggested ways to take good care of yourself. He or she also may have recommended tests. At home, you can help prevent illness with healthy eating, regular exercise, and other steps. Follow-up care is a key part of your treatment and safety. Be sure to make and go to all appointments, and call your doctor if you are having problems. It's also a good idea to know your test results and keep a list of the medicines you take. How can you care for yourself at home? · Reach and stay at a healthy weight. This will lower your risk for many problems, such as obesity, diabetes, heart disease, and high blood pressure. · Get at least 30 minutes of exercise on most days of the week. Walking is a good choice. You also may want to do other activities, such as running, swimming, cycling, or playing tennis or team sports. · Do not smoke. Smoking can make health problems worse. If you need help quitting, talk to your doctor about stop-smoking programs and medicines. These can increase your chances of quitting for good. · Protect your skin from too much sun. When you're outdoors from 10 a.m. to 4 p.m., stay in the shade or cover up with clothing and a hat with a wide brim. Wear sunglasses that block UV rays. Even when it's cloudy, put broad-spectrum sunscreen (SPF 30 or higher) on any exposed skin. · See a dentist one or two times a year for checkups and to have your teeth cleaned. · Wear a seat belt in the car. · Limit alcohol to 1 drink a day. Too much alcohol can cause health problems. Follow your doctor's advice about when to have certain tests. These tests can spot problems early. · Cholesterol.  Your doctor will tell you how often to have this done based on your age, family history, or other things that can increase your risk for heart attack and stroke. · Blood pressure. Have your blood pressure checked during a routine doctor visit. Your doctor will tell you how often to check your blood pressure based on your age, your blood pressure results, and other factors. · Mammogram. Ask your doctor how often you should have a mammogram, which is an X-ray of your breasts. A mammogram can spot breast cancer before it can be felt and when it is easiest to treat. · Pap test and pelvic exam. Ask your doctor how often you should have a Pap test. You may not need to have a Pap test as often as you used to. · Vision. Have your eyes checked every year or two or as often as your doctor suggests. Some experts recommend that you have yearly exams for glaucoma and other age-related eye problems starting at age 48. · Hearing. Tell your doctor if you notice any change in your hearing. You can have tests to find out how well you hear. · Diabetes. Ask your doctor whether you should have tests for diabetes. · Colon cancer. You should begin tests for colon cancer at age 48. You may have one of several tests. Your doctor will tell you how often to have tests based on your age and risk. Risks include whether you already had a precancerous polyp removed from your colon or whether your parents, sisters and brothers, or children have had colon cancer. · Thyroid disease. Talk to your doctor about whether to have your thyroid checked as part of a regular physical exam. Women have an increased chance of a thyroid problem. · Osteoporosis. You should begin tests for bone density at age 72. If you are younger than 72, ask your doctor whether you have factors that may increase your risk for this disease. You may want to have this test before age 72. · Heart attack and stroke risk. At least every 4 to 6 years, you should have your risk for heart attack and stroke assessed.  Your doctor uses factors such as your age, blood pressure, cholesterol, and whether you smoke or have diabetes to show what your risk for a heart attack or stroke is over the next 10 years. When should you call for help? Watch closely for changes in your health, and be sure to contact your doctor if you have any problems or symptoms that concern you. Where can you learn more? Go to http://julio-tati.info/. Enter S819 in the search box to learn more about \"Well Visit, Women 50 to 72: Care Instructions. \"  Current as of: July 19, 2016  Content Version: 11.3  © 5777-2734 [employer]. Care instructions adapted under license by [employer] (which disclaims liability or warranty for this information). If you have questions about a medical condition or this instruction, always ask your healthcare professional. [address] any warranty or liability for your use of this information.

## 2018-08-15 ENCOUNTER — HOSPITAL ENCOUNTER (OUTPATIENT)
Dept: MAMMOGRAPHY | Age: 58
Discharge: HOME | End: 2018-08-15
Attending: OBSTETRICS & GYNECOLOGY
Payer: COMMERCIAL

## 2018-08-15 DIAGNOSIS — Z12.31 VISIT FOR SCREENING MAMMOGRAM: ICD-10-CM

## 2018-08-15 PROCEDURE — 77063 BREAST TOMOSYNTHESIS BI: CPT

## 2018-09-20 ENCOUNTER — OFFICE VISIT (OUTPATIENT)
Dept: INTERNAL MEDICINE CLINIC | Age: 58
End: 2018-09-20

## 2018-09-20 VITALS
TEMPERATURE: 98.1 F | BODY MASS INDEX: 23.05 KG/M2 | OXYGEN SATURATION: 98 % | HEIGHT: 63 IN | HEART RATE: 77 BPM | WEIGHT: 130.1 LBS | RESPIRATION RATE: 18 BRPM | DIASTOLIC BLOOD PRESSURE: 74 MMHG | SYSTOLIC BLOOD PRESSURE: 119 MMHG

## 2018-09-20 DIAGNOSIS — Z00.00 PREVENTATIVE HEALTH CARE: Primary | ICD-10-CM

## 2018-09-20 NOTE — PROGRESS NOTES
1. Have you been to the ER, urgent care clinic since your last visit? Hospitalized since your last visit? No 
 
2. Have you seen or consulted any other health care providers outside of the [facility] since your last visit? Include any pap smears or colon screening.  No

## 2018-09-20 NOTE — MR AVS SNAPSHOT
Adry Espinal 
 
 
 . [facility] [address] [address] 
[phone] Patient: Terrie Lugo MRN: [id number]:[id number] Visit Information Date & Time Provider Department Dept. Phone Encounter #  
 9/20/2018  9:00 AM Hernesto Bermeo [facility] [phone] [id number] Follow-up Instructions Return in about 1 year (around 9/20/2019). Follow-up and Disposition History Upcoming Health Maintenance Date Due DTaP/Tdap/Td series (1 - Tdap) 9/20/2019* Influenza Age 5 to Adult 9/20/2019* PAP AKA CERVICAL CYTOLOGY 10/4/2019 BREAST CANCER SCRN MAMMOGRAM 8/15/2020 COLONOSCOPY 4/10/2022 *Topic was postponed. The date shown is not the original due date. Allergies as of 9/20/2018  Review Complete On: 9/20/2018 By: Julio Cesar Earl LPN No Known Allergies Current Immunizations  Never Reviewed No immunizations on file. Not reviewed this visit You Were Diagnosed With   
  
 Codes Comments Preventative health care    -  Primary ICD-10-CM: Z00.00 ICD-9-CM: V70.0 Vitals BP Pulse Temp Resp Height(growth percentile) Weight(growth percentile) 119/74 77 98.1 °F (36.7 °C) (Oral) 18 5' 3\" (1.6 m) 130 lb 1.6 oz (59 kg) SpO2 BMI OB Status Smoking Status 98% 23.05 kg/m2 Postmenopausal Former Smoker Vitals History BMI and BSA Data Body Mass Index Body Surface Area 23.05 kg/m 2 1.62 m 2 Preferred Pharmacy Pharmacy Name Phone [address], [address] [place] [address] [phone] Your Updated Medication List  
  
   
This list is accurate as of 9/20/18 10:15 AM.  Always use your most recent med list.  
  
  
  
  
 MULTIVITAMIN PO Take 1 Tab by mouth daily. VITAMIN B-12 PO Take 1 Tab by mouth daily. VITAMIN C PO Take 400 mg by mouth daily. We Performed the Following CBC WITH AUTOMATED DIFF [15980 CPT(R)] COLLECTION VENOUS BLOOD,VENIPUNCTURE [id number] CPT(R)] HEMOGLOBIN A1C WITH EAG [30703 CPT(R)] LIPID PANEL [51350 CPT(R)] METABOLIC PANEL, COMPREHENSIVE [53567 CPT(R)] TSH 3RD GENERATION [12443 CPT(R)] URINALYSIS W/ RFLX MICROSCOPIC [71528 CPT(R)] Follow-up Instructions Return in about 1 year (around 9/20/2019). Introducing hospitals & HEALTH SERVICES! Dear Jacklyn Mahmood: Thank you for requesting a [employer] account. Our records indicate that you already have an active [employer] account. You can access your account anytime at [email or web address] Did you know that you can access your hospital and ER discharge instructions at any time in [employer]ed? You can also review all of your test results from your hospital stay or ER visit. Additional Information If you have questions, please visit the Frequently Asked Questions section of the [employer]ed website at [email or web address] Remember, [employer]ed is NOT to be used for urgent needs. For medical emergencies, dial 911. Now available from your iPhone and Android! Please provide this summary of care documentation to your next provider. Your primary care clinician is listed as Johana Greer. If you have any questions after today's visit, please call [phone].

## 2018-09-20 NOTE — PROGRESS NOTES
SPORTS MEDICINE AND PRIMARY CARE Madyson Partida MD, [address] [address] Phone:  [phone]  Fax: [phone] Chief Complaint Patient presents with  Annual Exam  
 
 
 
SUBECTIVE: 
 
Randee Chapa is a 66 y.o. female Patient returns today for her annual physical examination. She is doing well. She is up to date on her required studies. Since we last saw her she did 80,000 steps, which is close to 40 miles. It took about seven hours and that is just amazing. She is otherwise doing well and is following appropriate recommendations. Patient is seen for evaluation. Current Outpatient Prescriptions Medication Sig Dispense Refill  CYANOCOBALAMIN, VITAMIN B-12, (VITAMIN B-12 PO) Take 1 Tab by mouth daily.  ASCORBATE CALCIUM (VITAMIN C PO) Take 400 mg by mouth daily.  MULTIVITAMIN PO Take 1 Tab by mouth daily. Past Medical History:  
Diagnosis Date  Deltoid tendinitis of left shoulder 2017  Gynecologic exam normal   
 Prediabetes 2016  Preventative health care 2016  S/P colonoscopy 2012  
 n  Sinus congestion Past Surgical History:  
Procedure Laterality Date  HX  SECTION    
 HX GYN No Known Allergies REVIEW OF SYSTEMS: 
 No chest pain or shortness of breath. Social History Social History  Marital status:  Spouse name: N/A  
 Number of children: N/A  
 Years of education: N/A Social History Main Topics  Smoking status: Former Smoker  Smokeless tobacco: Never Used  Alcohol use No  
 Drug use: No  
 Sexual activity: Yes  
  Partners: Male Birth control/ protection: None Other Topics Concern  None Social History Narrative Habits: There is no history of alcohol, cigarette or drug abuse.    
    
 Social History: The patient is . Lives with her .  She completed her AMADO. She is gainfully employed with [employer]. 22year old son. grandchildren. Patient is a member of [facility].   
    
 Family History: Father  at 59 of a myocardial infarction, with memory loss. She has one sister alive and well.   
r 
Family History Problem Relation Age of Onset  Heart Disease Father OBJECTIVE: 
Visit Vitals  /74  Pulse 77  Temp 98.1 °F (36.7 °C) (Oral)  Resp 18  Ht 5' 3\" (1.6 m)  Wt 130 lb 1.6 oz (59 kg)  SpO2 98%  BMI 23.05 kg/m2 ENT: perrla,  eom intact NECK: supple. Thyroid normal 
CHEST: clear to ascultation and percussion HEART: regular rate and rhythm ABD: soft, bowel sounds active EXTREMITIES: no edema, pulse 1+ No visits with results within 3 Month(s) from this visit. Latest known visit with results is: 
 
Office Visit on 08/15/2017 Component Date Value Ref Range Status  Specific Gravity 08/15/2017 1.024  1.005 - 1.030 Final  
 pH (UA) 08/15/2017 6.0  5.0 - 7.5 Final  
 Color 08/15/2017 Yellow  Yellow Final  
 Appearance 08/15/2017 Cloudy* Clear Final  
 Leukocyte Esterase 08/15/2017 1+* Negative Final  
 Protein 08/15/2017 Negative  Negative/Trace Final  
 Glucose 08/15/2017 Negative  Negative Final  
 Ketone 08/15/2017 Negative  Negative Final  
 Blood 08/15/2017 Negative  Negative Final  
 Bilirubin 08/15/2017 Negative  Negative Final  
 Urobilinogen 08/15/2017 0.2  0.2 - 1.0 mg/dL Final  
 Nitrites 08/15/2017 Negative  Negative Final  
 Microscopic Examination 08/15/2017 See additional order   Final  
 Microscopic was indicated and was performed.   
 WBC 08/15/2017 3.5  3.4 - 10.8 x10E3/uL Final  
 RBC 08/15/2017 4.82  3.77 - 5.28 x10E6/uL Final  
 HGB 08/15/2017 13.6  11.1 - 15.9 g/dL Final  
 HCT 08/15/2017 42.3  34.0 - 46.6 % Final  
 MCV 08/15/2017 88  79 - 97 fL Final  
 MCH 08/15/2017 28.2  26.6 - 33.0 pg Final  
  MCHC 08/15/2017 32.2  31.5 - 35.7 g/dL Final  
 RDW 08/15/2017 14.1  12.3 - 15.4 % Final  
 PLATELET 77/79/1114 192  150 - 379 x10E3/uL Final  
 NEUTROPHILS 08/15/2017 44  % Final  
 Lymphocytes 08/15/2017 44  % Final  
 MONOCYTES 08/15/2017 10  % Final  
 EOSINOPHILS 08/15/2017 0  % Final  
 BASOPHILS 08/15/2017 1  % Final  
 ABS. NEUTROPHILS 08/15/2017 1.6  1.4 - 7.0 x10E3/uL Final  
 Abs Lymphocytes 08/15/2017 1.5  0.7 - 3.1 x10E3/uL Final  
 ABS. MONOCYTES 08/15/2017 0.4  0.1 - 0.9 x10E3/uL Final  
 ABS. EOSINOPHILS 08/15/2017 0.0  0.0 - 0.4 x10E3/uL Final  
 ABS. BASOPHILS 08/15/2017 0.0  0.0 - 0.2 x10E3/uL Final  
 IMMATURE GRANULOCYTES 08/15/2017 1  % Final  
 ABS. IMM. GRANS. 08/15/2017 0.0  0.0 - 0.1 x10E3/uL Final  
 Glucose 08/15/2017 99  65 - 99 mg/dL Final  
 BUN 08/15/2017 16  6 - 24 mg/dL Final  
 Creatinine 08/15/2017 0.64  0.57 - 1.00 mg/dL Final  
 GFR est non-AA 08/15/2017 101  >59 mL/min/1.73 Final  
 GFR est AA 08/15/2017 116  >59 mL/min/1.73 Final  
 BUN/Creatinine ratio 08/15/2017 25* 9 - 23 Final  
 Sodium 08/15/2017 140  134 - 144 mmol/L Final  
 Potassium 08/15/2017 4.6  3.5 - 5.2 mmol/L Final  
 Chloride 08/15/2017 100  96 - 106 mmol/L Final  
 CO2 08/15/2017 24  18 - 29 mmol/L Final  
 Calcium 08/15/2017 10.0  8.7 - 10.2 mg/dL Final  
 Protein, total 08/15/2017 7.4  6.0 - 8.5 g/dL Final  
 Albumin 08/15/2017 4.3  3.5 - 5.5 g/dL Final  
 GLOBULIN, TOTAL 08/15/2017 3.1  1.5 - 4.5 g/dL Final  
 A-G Ratio 08/15/2017 1.4  1.2 - 2.2 Final  
 Bilirubin, total 08/15/2017 <0.2  0.0 - 1.2 mg/dL Final  
 Alk.  phosphatase 08/15/2017 84  39 - 117 IU/L Final  
 AST (SGOT) 08/15/2017 25  0 - 40 IU/L Final  
 ALT (SGPT) 08/15/2017 20  0 - 32 IU/L Final  
 Cholesterol, total 08/15/2017 184  100 - 199 mg/dL Final  
 Triglyceride 08/15/2017 95  0 - 149 mg/dL Final  
 HDL Cholesterol 08/15/2017 68  >39 mg/dL Final  
 VLDL, calculated 08/15/2017 19  5 - 40 mg/dL Final  
  LDL, calculated 08/15/2017 97  0 - 99 mg/dL Final  
 TSH 08/15/2017 0.544  0.450 - 4.500 uIU/mL Final  
 Hemoglobin A1c 08/15/2017 5.8* 4.8 - 5.6 % Final  
 Comment:          Pre-diabetes: 5.7 - 6.4 Diabetes: >6.4 Glycemic control for adults with diabetes: <7.0  Estimated average glucose 08/15/2017 120  mg/dL Final  
 WBC 08/15/2017 6-10* 0 - 5 /hpf Final  
 RBC 08/15/2017 0-2  0 - 2 /hpf Final  
 Epithelial cells 08/15/2017 0-10  0 - 10 /hpf Final  
 Casts 08/15/2017 None seen  None seen /lpf Final  
 Crystals 08/15/2017 Present* N/A Final  
 Crystal type 08/15/2017 Calcium Oxalate  N/A Final  
 Mucus 08/15/2017 Present  Not Estab. Final  
 Bacteria 08/15/2017 Few  None seen/Few Final  
 
  
 
ASSESSMENT: 
1. Preventative health care Patient's medical status is stable. BP control is excellent. She is at her ideal body weight. Also indicates excellent physical condition. It's up a little bit today as would be expected as she is coming to see the doctor. We encourage continued physical activity and the things that she is doing to maintain a heart healthy lifestyle. She'll return to the office in one year. She's advised she can walk in to see us at any time should she have an urgency and unable to get an appointment. I reminded her the hospital we use is [facility]. 
 
 
I have discussed the diagnosis with the patient and the intended plan as seen in the 
orders above. The patient understands and agees with the plan. The patient has  
received an after visit summary and questions were answered concerning 
future plans Patient labs and/or xrays were reviewed Past records were reviewed. PLAN: 
. Orders Placed This Encounter  URINALYSIS W/ RFLX MICROSCOPIC  CBC WITH AUTOMATED DIFF  
 METABOLIC PANEL, COMPREHENSIVE  LIPID PANEL  
 TSH 3RD GENERATION  
 HEMOGLOBIN A1C WITH EAG Follow-up Disposition: Return in about 1 year (around 9/20/2019). ATTENTION:  
This medical record was transcribed using an electronic medical records system. Although proofread, it may and can contain electronic and spelling errors. Other human spelling and other errors may be present. Corrections may be executed at a later time. Please feel free to contact us for any clarifications as needed.

## 2018-09-21 LAB
ALBUMIN SERPL-MCNC: 4.9 G/DL (ref 3.5–5.5)
ALBUMIN/GLOB SERPL: 1.8 {RATIO} (ref 1.2–2.2)
ALP SERPL-CCNC: 82 IU/L (ref 39–117)
ALT SERPL-CCNC: 18 IU/L (ref 0–32)
APPEARANCE UR: CLEAR
AST SERPL-CCNC: 26 IU/L (ref 0–40)
BASOPHILS # BLD AUTO: 0 X10E3/UL (ref 0–0.2)
BASOPHILS NFR BLD AUTO: 1 %
BILIRUB SERPL-MCNC: 0.4 MG/DL (ref 0–1.2)
BILIRUB UR QL STRIP: NEGATIVE
BUN SERPL-MCNC: 14 MG/DL (ref 6–24)
BUN/CREAT SERPL: 18 (ref 9–23)
CALCIUM SERPL-MCNC: 10.2 MG/DL (ref 8.7–10.2)
CHLORIDE SERPL-SCNC: 103 MMOL/L (ref 96–106)
CHOLEST SERPL-MCNC: 198 MG/DL (ref 100–199)
CO2 SERPL-SCNC: 23 MMOL/L (ref 20–29)
COLOR UR: YELLOW
CREAT SERPL-MCNC: 0.8 MG/DL (ref 0.57–1)
EOSINOPHIL # BLD AUTO: 0 X10E3/UL (ref 0–0.4)
EOSINOPHIL NFR BLD AUTO: 0 %
ERYTHROCYTE [DISTWIDTH] IN BLOOD BY AUTOMATED COUNT: 14.3 % (ref 12.3–15.4)
EST. AVERAGE GLUCOSE BLD GHB EST-MCNC: 120 MG/DL
GLOBULIN SER CALC-MCNC: 2.7 G/DL (ref 1.5–4.5)
GLUCOSE SERPL-MCNC: 91 MG/DL (ref 65–99)
GLUCOSE UR QL: NEGATIVE
HBA1C MFR BLD: 5.8 % (ref 4.8–5.6)
HCT VFR BLD AUTO: 43.7 % (ref 34–46.6)
HDLC SERPL-MCNC: 63 MG/DL
HGB BLD-MCNC: 14.6 G/DL (ref 11.1–15.9)
HGB UR QL STRIP: NEGATIVE
IMM GRANULOCYTES # BLD: 0 X10E3/UL (ref 0–0.1)
IMM GRANULOCYTES NFR BLD: 0 %
KETONES UR QL STRIP: ABNORMAL
LDLC SERPL CALC-MCNC: 125 MG/DL (ref 0–99)
LEUKOCYTE ESTERASE UR QL STRIP: NEGATIVE
LYMPHOCYTES # BLD AUTO: 1.8 X10E3/UL (ref 0.7–3.1)
LYMPHOCYTES NFR BLD AUTO: 58 %
MCH RBC QN AUTO: 29.1 PG (ref 26.6–33)
MCHC RBC AUTO-ENTMCNC: 33.4 G/DL (ref 31.5–35.7)
MCV RBC AUTO: 87 FL (ref 79–97)
MICRO URNS: ABNORMAL
MONOCYTES # BLD AUTO: 0.3 X10E3/UL (ref 0.1–0.9)
MONOCYTES NFR BLD AUTO: 10 %
MORPHOLOGY BLD-IMP: ABNORMAL
NEUTROPHILS # BLD AUTO: 1 X10E3/UL (ref 1.4–7)
NEUTROPHILS NFR BLD AUTO: 31 %
NITRITE UR QL STRIP: NEGATIVE
PH UR STRIP: 5.5 [PH] (ref 5–7.5)
PLATELET # BLD AUTO: 281 X10E3/UL (ref 150–379)
POTASSIUM SERPL-SCNC: 4.8 MMOL/L (ref 3.5–5.2)
PROT SERPL-MCNC: 7.6 G/DL (ref 6–8.5)
PROT UR QL STRIP: NEGATIVE
RBC # BLD AUTO: 5.01 X10E6/UL (ref 3.77–5.28)
SODIUM SERPL-SCNC: 143 MMOL/L (ref 134–144)
SP GR UR: >=1.03 (ref 1–1.03)
TRIGL SERPL-MCNC: 51 MG/DL (ref 0–149)
TSH SERPL DL<=0.005 MIU/L-ACNC: 0.78 UIU/ML (ref 0.45–4.5)
UROBILINOGEN UR STRIP-MCNC: 0.2 MG/DL (ref 0.2–1)
VLDLC SERPL CALC-MCNC: 10 MG/DL (ref 5–40)
WBC # BLD AUTO: 3.1 X10E3/UL (ref 3.4–10.8)

## 2019-10-29 ENCOUNTER — OFFICE VISIT (OUTPATIENT)
Dept: INTERNAL MEDICINE CLINIC | Age: 59
End: 2019-10-29

## 2019-10-29 VITALS
DIASTOLIC BLOOD PRESSURE: 71 MMHG | TEMPERATURE: 97.8 F | RESPIRATION RATE: 18 BRPM | BODY MASS INDEX: 24.15 KG/M2 | HEART RATE: 78 BPM | SYSTOLIC BLOOD PRESSURE: 111 MMHG | OXYGEN SATURATION: 100 % | WEIGHT: 136.3 LBS | HEIGHT: 63 IN

## 2019-10-29 DIAGNOSIS — Z00.00 PREVENTATIVE HEALTH CARE: Primary | ICD-10-CM

## 2019-10-29 DIAGNOSIS — R73.03 PREDIABETES: ICD-10-CM

## 2019-10-29 DIAGNOSIS — R09.81 SINUS CONGESTION: ICD-10-CM

## 2019-10-29 NOTE — PROGRESS NOTES
1. Have you been to the ER, urgent care clinic since your last visit? Hospitalized since your last visit? No    2. Have you seen or consulted any other health care providers outside of the [address] [place] [address] since your last visit? Include any pap smears or colon screening.  No

## 2019-10-29 NOTE — PROGRESS NOTES
SPORTS MEDICINE AND PRIMARY CARE  Dorota Perez MD, [address] [address]  Phone:  [phone]  Fax: [phone]       Chief Complaint   Patient presents with    Annual Exam   .      SUBJECTIVE:    Beck Taylor is a 64 y.o. female Patient returns today for preventive healthcare visit. She has a history of prediabetes, sinus congestion, and is seen for evaluation. Patient took an early long-term package in February, enjoying long-term, she is traveling a lot. Specific complaints denied and patient is seen for wellness check. Current Outpatient Medications   Medication Sig Dispense Refill    CYANOCOBALAMIN, VITAMIN B-12, (VITAMIN B-12 PO) Take 1 Tab by mouth daily.  ASCORBATE CALCIUM (VITAMIN C PO) Take 400 mg by mouth daily.  MULTIVITAMIN PO Take 1 Tab by mouth daily.        Past Medical History:   Diagnosis Date    Deltoid tendinitis of left shoulder 2017    Gynecologic exam normal     Prediabetes 2016    Preventative health care 2016    S/P colonoscopy 2012    n    Sinus congestion      Past Surgical History:   Procedure Laterality Date    HX  SECTION      HX GYN       No Known Allergies      REVIEW OF SYSTEMS:  General: negative for - chills or fever  ENT: negative for - headaches, nasal congestion or tinnitus  Respiratory: negative for - cough, hemoptysis, shortness of breath or wheezing  Cardiovascular : negative for - chest pain, edema, palpitations or shortness of breath  Gastrointestinal: negative for - abdominal pain, blood in stools, heartburn or nausea/vomiting  Genito-Urinary: no dysuria, trouble voiding, or hematuria  Musculoskeletal: negative for - gait disturbance, joint pain, joint stiffness or joint swelling  Neurological: no TIA or stroke symptoms  Hematologic: no bruises, no bleeding, no swollen glands  Integument: no lumps, mole changes, nail changes or rash  Endocrine: no malaise/lethargy or unexpected weight changes      Social History     Socioeconomic History    Marital status:      Spouse name: Not on file    Number of children: Not on file    Years of education: Not on file    Highest education level: Not on file   Tobacco Use    Smoking status: Former Smoker    Smokeless tobacco: Never Used   Substance and Sexual Activity    Alcohol use: No    Drug use: No    Sexual activity: Yes     Partners: Male     Birth control/protection: None   Social History Narrative    Habits: There is no history of alcohol, cigarette or drug abuse.           Social History: The patient is . Lives with her . She completed her AMADO. She wasemployed with [employer] retired [employer]. 22year old son. grandchildren. Patient is a member of [facility] [employer].          Family History: Father  at 59 of a myocardial infarction, with memory loss. She has one sister alive and well. Family History   Problem Relation Age of Onset    Heart Disease Father        OBJECTIVE:    Visit Vitals  /71   Pulse 78   Temp 97.8 °F (36.6 °C) (Oral)   Resp 18   Ht 5' 3\" (1.6 m)   Wt 136 lb 4.8 oz (61.8 kg)   SpO2 100%   BMI 24.14 kg/m²     CONSTITUTIONAL: well , well nourished, appears age appropriate  EYES: perrla, eom intact  ENMT:moist mucous membranes, pharynx clear  NECK: supple. Thyroid normal  RESPIRATORY: Chest: clear bilaterally   CARDIOVASCULAR: Heart: regular rate and rhythm  GASTROINTESTINAL: Abdomen: soft, bowel sounds active  HEMATOLOGIC: no pathological lymph nodes palpated  MUSCULOSKELETAL: Extremities: no edema, pulse 1+   INTEGUMENT: No unusual rashes or suspicious skin lesions noted. Nails appear normal.  NEUROLOGIC: non-focal exam   MENTAL STATUS: alert and oriented, appropriate affect           ASSESSMENT:  1. Preventative health care    2. Prediabetes    3. Sinus congestion      Patient's medical status is stable. She is a healthy female. She is at her ideal body weight. Blood pressure control is at goal without medications. We encourage her to continue physical activity for at least 30 minutes five days a week, a heart healthy lifestyle. Will check appropriate laboratory studies and mammograms and send her the results in the mail. Unless something comes up she will see us in one year. We remind her that we use [facility] for emergencies, she can call or walk in and see us if she is unable to get an appointment and needs to be seen. I have discussed the diagnosis with the patient and the intended plan as seen in the  orders above. The patient understands and agees with the plan. The patient has   received an after visit summary and questions were answered concerning  future plans  Patient labs and/or xrays were reviewed  Past records were reviewed. PLAN:  .  Orders Placed This Encounter    NOEL MAMMO BI SCREENING INCL CAD    URINALYSIS W/ RFLX MICROSCOPIC    CBC WITH AUTOMATED DIFF    METABOLIC PANEL, COMPREHENSIVE    LIPID PANEL    TSH 3RD GENERATION    HEMOGLOBIN A1C WITH EAG       Follow-up and Dispositions    · Return in about 1 year (around 10/29/2020). ATTENTION:   This medical record was transcribed using an electronic medical records system. Although proofread, it may and can contain electronic and spelling errors. Other human spelling and other errors may be present. Corrections may be executed at a later time. Please feel free to contact us for any clarifications as needed.

## 2019-10-30 LAB
ALBUMIN SERPL-MCNC: 4.7 G/DL (ref 3.5–5.5)
ALBUMIN/GLOB SERPL: 1.6 {RATIO} (ref 1.2–2.2)
ALP SERPL-CCNC: 80 IU/L (ref 39–117)
ALT SERPL-CCNC: 19 IU/L (ref 0–32)
APPEARANCE UR: CLEAR
AST SERPL-CCNC: 28 IU/L (ref 0–40)
BASOPHILS # BLD AUTO: 0 X10E3/UL (ref 0–0.2)
BASOPHILS NFR BLD AUTO: 0 %
BILIRUB SERPL-MCNC: 0.6 MG/DL (ref 0–1.2)
BILIRUB UR QL STRIP: NEGATIVE
BUN SERPL-MCNC: 10 MG/DL (ref 6–24)
BUN/CREAT SERPL: 13 (ref 9–23)
CALCIUM SERPL-MCNC: 10.3 MG/DL (ref 8.7–10.2)
CHLORIDE SERPL-SCNC: 104 MMOL/L (ref 96–106)
CHOLEST SERPL-MCNC: 200 MG/DL (ref 100–199)
CO2 SERPL-SCNC: 21 MMOL/L (ref 20–29)
COLOR UR: YELLOW
CREAT SERPL-MCNC: 0.77 MG/DL (ref 0.57–1)
EOSINOPHIL # BLD AUTO: 0 X10E3/UL (ref 0–0.4)
EOSINOPHIL NFR BLD AUTO: 0 %
ERYTHROCYTE [DISTWIDTH] IN BLOOD BY AUTOMATED COUNT: 13.2 % (ref 12.3–15.4)
EST. AVERAGE GLUCOSE BLD GHB EST-MCNC: 117 MG/DL
GLOBULIN SER CALC-MCNC: 2.9 G/DL (ref 1.5–4.5)
GLUCOSE SERPL-MCNC: 59 MG/DL (ref 65–99)
GLUCOSE UR QL: NEGATIVE
HBA1C MFR BLD: 5.7 % (ref 4.8–5.6)
HCT VFR BLD AUTO: 42.2 % (ref 34–46.6)
HDLC SERPL-MCNC: 70 MG/DL
HGB BLD-MCNC: 13.9 G/DL (ref 11.1–15.9)
HGB UR QL STRIP: NEGATIVE
IMM GRANULOCYTES # BLD AUTO: 0 X10E3/UL (ref 0–0.1)
IMM GRANULOCYTES NFR BLD AUTO: 1 %
KETONES UR QL STRIP: NEGATIVE
LDLC SERPL CALC-MCNC: 119 MG/DL (ref 0–99)
LEUKOCYTE ESTERASE UR QL STRIP: NEGATIVE
LYMPHOCYTES # BLD AUTO: 1.7 X10E3/UL (ref 0.7–3.1)
LYMPHOCYTES NFR BLD AUTO: 41 %
MCH RBC QN AUTO: 28.5 PG (ref 26.6–33)
MCHC RBC AUTO-ENTMCNC: 32.9 G/DL (ref 31.5–35.7)
MCV RBC AUTO: 87 FL (ref 79–97)
MICRO URNS: ABNORMAL
MONOCYTES # BLD AUTO: 0.3 X10E3/UL (ref 0.1–0.9)
MONOCYTES NFR BLD AUTO: 8 %
NEUTROPHILS # BLD AUTO: 2.2 X10E3/UL (ref 1.4–7)
NEUTROPHILS NFR BLD AUTO: 50 %
NITRITE UR QL STRIP: NEGATIVE
PH UR STRIP: 6.5 [PH] (ref 5–7.5)
PLATELET # BLD AUTO: 292 X10E3/UL (ref 150–450)
POTASSIUM SERPL-SCNC: 4.3 MMOL/L (ref 3.5–5.2)
PROT SERPL-MCNC: 7.6 G/DL (ref 6–8.5)
PROT UR QL STRIP: NEGATIVE
RBC # BLD AUTO: 4.87 X10E6/UL (ref 3.77–5.28)
SODIUM SERPL-SCNC: 143 MMOL/L (ref 134–144)
SP GR UR: <=1.005 (ref 1–1.03)
TRIGL SERPL-MCNC: 55 MG/DL (ref 0–149)
TSH SERPL DL<=0.005 MIU/L-ACNC: 0.38 UIU/ML (ref 0.45–4.5)
UROBILINOGEN UR STRIP-MCNC: 0.2 MG/DL (ref 0.2–1)
VLDLC SERPL CALC-MCNC: 11 MG/DL (ref 5–40)
WBC # BLD AUTO: 4.3 X10E3/UL (ref 3.4–10.8)

## 2019-11-20 ENCOUNTER — HOSPITAL ENCOUNTER (OUTPATIENT)
Dept: MAMMOGRAPHY | Age: 59
Discharge: HOME | End: 2019-11-20
Attending: INTERNAL MEDICINE
Payer: COMMERCIAL

## 2019-11-20 DIAGNOSIS — Z12.31 VISIT FOR SCREENING MAMMOGRAM: ICD-10-CM

## 2019-11-20 PROCEDURE — 77063 BREAST TOMOSYNTHESIS BI: CPT

## 2022-03-19 PROBLEM — M77.8 DELTOID TENDINITIS OF LEFT SHOULDER: Status: ACTIVE | Noted: 2017-08-08
